# Patient Record
(demographics unavailable — no encounter records)

---

## 2024-11-14 NOTE — PHYSICAL EXAM
[Alert] : alert [No Acute Distress] : no acute distress [EOMI] : extra ocular movement intact [Thyroid Not Enlarged] : the thyroid was not enlarged [No Thyroid Nodules] : no palpable thyroid nodules [No Respiratory Distress] : no respiratory distress [Clear to Auscultation] : lungs were clear to auscultation bilaterally [Normal S1, S2] : normal S1 and S2 [Normal Rate] : heart rate was normal [Not Tender] : non-tender [Soft] : abdomen soft [No Spinal Tenderness] : no spinal tenderness [Oriented x3] : oriented to person, place, and time [Normal Affect] : the affect was normal [Normal Insight/Judgement] : insight and judgment were intact [Normal Mood] : the mood was normal [Kyphosis] : no kyphosis present [Scoliosis] : no scoliosis

## 2024-11-14 NOTE — DATA REVIEWED
[FreeTextEntry1] : Thyroid sono 6/2017 - Left thyroid nodule 6x3x3 mm  Thyroid sono 4/5/18 Right MP nodule - compex nodule, 3x3x2 mm Left lower pole nodule complex nodule 6x6x4 mm   Thyroid sono 4/3/19  stable Right nodule 3 mm, new small Right nodule 3 mm Left nodule 8mm (slight increased from 6 mm) and new small 2 mm Left nodule  Thyroid sono 9/30/19 normal thyroid size RMP nodule 3x3 mm - stable RLP nodule 3x2 mm - new on report but was documented on last sono LLP/inferior pole nodule 9x9 mm - slightly increased  Left thyroid nodule FNA 12/11/19 = AUS, Molecular testing not done b/c not enough sample  Thyroid sono 6/12/20 reviewed no nodule in Left thyroid seen RMP nodule 4x2x4 mm, stable RLP nodule 3x1x3, complex cystic, new  Thryoids son 10/2/20 RMP nodule 4x4x2 mm - cystic, stable RLP nodule 2x2x2 mm - cystic, stable no nodule Left lobe  Thyroid sonogram 9/28/21 RLP nodule 3x3x2 mm - cyst with debris, stable LUP nodule 2x2 mm - cyst, new LUP nodule 3x2x2 mm - colloid cyst, new LMP nodule 2x2x1 mm - colloid cyst, new  Thyroid sonogram 10/11/22  RMP nodule 6x3x6 mm - spongiform. isoechoic LUP nodule 2x2x2 mm - solid, hypoechoic LMP nodule 19x9x6 mm - solid, hypoechoic, irregular margins, new  Thyroid FNA biopsy 10/28/22 Left mid pole thyroid nodule 1.9 cm - benign follicular nodule c/w nodular goiter and post-hemorrhagic change, cat 2  Thyroid sonogram 10/3/23 RMP nodule 6x2x7 mm - TR4 stable LLP nodule 5x5x1 mm - TR3, new previously biopsied Left nodule not seen  Thyroids julia 8/15/24 subcm thyroid nodules, no parathyroid lesions - RMp nodule 7x3x5 mm, RUP nodule [id number], LMP 6x3x4 mm ======================================================= Labs 9/28/20 TSH 1.37, Ft4 1.3, negTPO ab Labs 10/21/22 TSH 2.150, Ft4 1.19, FT3 2.7 Labs 5/20/24 Gluc 91 Cr 0.61,  Ca 9.0, iPTH 56, alb 4, vit D 33 normal SPEP TSH 2.05 TTG neg  labs 5/20/24 Ca 9, PTH 56, alb 4, vit D 33 SPEP nml TSh 2.05 TTG ab neg  Labs 5/23/24: UPEP - elevated protein, 85, possible monoclonal immunoglobulin 24 hour urine calcium: 438

## 2024-11-14 NOTE — REVIEW OF SYSTEMS
[As Noted in HPI] : as noted in HPI [Dysphagia] : no dysphagia [Neck Pain] : no neck pain [Dysphonia] : no dysphonia

## 2024-11-14 NOTE — HISTORY OF PRESENT ILLNESS
[None] : The patient is not currently taking any medication for this problem. [Low Calcium Intake] : low calcium intake [Kidney Stones] : a history of kidney stones [Previous Fragility Fracture] : previous fragility fracture(s) [Regular Dental Follow-Up] : regular dental follow-up [Family History of Osteoporosis] : family history of osteoporosis [FreeTextEntry1] : Osteoporosis and recent right wrist fracture s/p fall, 2 years ago tried actonel and boniva but she stopped it on her own, went thru menopause at 41 yrs (intolerant to HRT) no h/o RT/VTE Mom also with early menopause and osteoporosis. BMD has been done by Gyn but she refused osteoporosis therapy - DXA 2024: Tscores, compared to 2023 L1-L4 -3.7, worsening -3.6% change LFN -2.5 Lhip -2.5 worsening  -5% change RFN -2.2  Rhip -2.2, worsening -5.4% change   Left Thyroid nodule since 2015 and in 2018 another Right thyroid nodule found and now with 2 Right thyroid nodules.   Left nodule increasing in sizen and attempted Left thyroid nodule FNA 12/2019 but results AUS.  Subsequent sonogram did not show Left thyroid nodule. Thyroid FNA biopsy 10/28/22 Left mid pole thyroid nodule 1.9 cm - benign follicular nodule c/w nodular goiter and post-hemorrhagic change, cat 2  Modifying factors: She does not take any thyroid medications.   There is no known radiation exposure to the neck.  Interval hx - (+) renal stones, had been seeing urology doctor in past, here for follow up lab results Thyroid sonogram 8/15/24 - subcm bilateral thyroid nodules, no parathyroid lesions s/p parathyroid surgery 10/28/24 w dr he - LLP, LUP and RLp parathyroid gland. LUP and LLP - hypercellular parathyroid did not start boniva yet [Low Vit D Intake/Exposure] : no low vitamin D intake [Premat. Menopause (surgery)] : no history of premature surgical menopause [Amenorrhea] : no amenorrhea [Disordered Eating] : no history of disordered eating [Taking Steroids] : no history of taking steroids [Diabetes] : no history of diabetes [Testosterone Deficiency] : no testosterone deficiency [Excessive Alcohol Intake] : no excessive alcohol intake [Excessive Soda] : no excessive soda [Sedentary] : no sedentary lifestyle [Current Smoking___(ppd)] : not currently smoking [Family History of Breast Cancer] : no family history of breast cancer [Family History of Hip Fracture] : no family history of hip fracture [History of Radiation Therapy] : no history of radiation therapy [History of Blood Clots] : no history of blood clots [High Fall Risk] : no fall risk [Frequent Falls] : no frequent falls [Uses a Walker/Cane] : no use of a walker/cane

## 2024-11-14 NOTE — ASSESSMENT
[FreeTextEntry1] : 59 yr old female with:  1. Bilateral thyroid nodules Left Thyroid nodule since 2015 and in 2018 another Right thyroid nodule found and now with 2 Right thyroid nodules. Left nodule increasing in size and attempted Left thyroid nodule FNA 12/2019 but results AUS. [place] 2022 with irregular Left nodule TR5, she is euthyroid - benign FNA in 2022 - repeat sono later this year - normal TSH  2. Osteoporosis - history of osteoporosis and recent right wrist fracture s/p fall, 2 years ago tried actonel and boniva for 6 months but she stopped it on her own, went thru menopause at 41 yrs (intolerant to HRT) no h/o RT/VTE Mom also with early menopause and osteoporosis. BMD has been done by Gyn but she refused osteoporosis therapy - tetsing revealed: normal TSH, SPEP, neg celiac, normal iPTH level, normal calcium and vit D - UPEP - elevated protein, 85, possible monoclonal immunoglobulin and elevated 24 hour urine calcium: 438; unclear if urine protein related to chronic kidney stones, advised urology follow up for hypercalcicuria and kidney stones, may need heme eval as well - again discussed OP therapy, she will fax results of most recent BMD, meds discussed and she is willing to start with monthly Boniva

## 2025-04-02 NOTE — ASSESSMENT
[FreeTextEntry1] : 60 yr old female with:  1. Bilateral thyroid nodules -  Left nodule increasing in size and attempted Left thyroid nodule FNA 12/2019 but results AUS. [place] 2022 with irregular Left nodule TR5 and benign FNA in 2022. Thyroid sonogram with bilateral subcm thyroid nodules - normal TSH - repeat sonogram later this year  2. Osteoporosis, primary HPT - history of osteoporosis and recent right wrist fracture s/p fall, 2 years ago tried actonel and boniva for 6 months but she stopped it on her own, went thru menopause at 41 yrs (intolerant to HRT) no h/o RT/VTE Mom also with early menopause and osteoporosis. She refused osteoporosis therapy in past - work up 5/2024 revealed: normal TSH, SPEP, neg celiac, normal iPTH level, normal calcium and vit D - UPEP - elevated protein, 85, possible monoclonal immunoglobulin and elevated 24 hour urine calcium: 438; unclear if urine protein related to chronic kidney stones, she followed up with urology and labs 7/2024 showed elevated PTH level.   -she saw Dr Gonzalez for primary hyperparathyroidism with subtle parathyroid adenoma on parathyroid CT.On Oct 28/2024 she underwent s/p parathyroid surgery (3 gland removal) 10/28/24 w Dr Gonzalez - LLP, LUP and RLP parathyroid gland. LUP and LLP - hypercellular parathyroid. Post op iPTH 38 with calcium 9.6 - repeat labs 2/2025  normal 24 hour urine calcium, normal UPEP, normal calcium, vitamin D, parathyroid hormone and thyroid hormone levels  - monitor calcium, PTH and vit D levels - suspect worsening osteoporosis, wrist fracture due to parathyroid disease given recent events and bone may improve post parathyroid surgery, hold off on Boniva at this time (she is hesitant to start this anyways), repeat DXA later this year - encouraged weight bearing exervise - cont calcium and vit D supplementation

## 2025-04-02 NOTE — HISTORY OF PRESENT ILLNESS
[Low Calcium Intake] : low calcium intake [Kidney Stones] : a history of kidney stones [Previous Fragility Fracture] : previous fragility fracture(s) [Regular Dental Follow-Up] : regular dental follow-up [Family History of Osteoporosis] : family history of osteoporosis [FreeTextEntry1] : Interval hx - feels well   Primary Hyperparathyroidism - (+) renal stones, saw Urology given h/o elevated 24 hour urine calcium and elevated PTH level in July 2024nand she advised to see Dr Gonzalez for primary hyperparathyroidism - Thyroid sonogram 8/15/24 - subcm bilateral thyroid nodules, no parathyroid lesions - Parathyroid Ct scan 10/1/24 - subtle focus os increased activity in Left mid-lower pole thyroid lobe, possible parathyroid adenoma - s/p parathyroid surgery (3 gland removal) 10/28/24 w Dr Gonzalez - LLP, LUP and RLP parathyroid gland. LUP and LLP - hypercellular parathyroid. Post op iPTH 38 with calcium 9.6 - labs 2/2025 normal 24 hour urine calcium, urine protein test, calcium, vitamin D, parathyroid hormone and thyroid hormone levels. - has been on Ca citrate 600 mg daily for renal stones and OTC Vit D daily  Osteoporosis and right wrist fracture s/p fall - 2 years ago tried actonel and boniva but she stopped it on her own, went thru menopause at 41 yrs (intolerant to HRT) no h/o RT/VTE Mom also with early menopause and osteoporosis.  - BMD has been done by Gyn but she refused osteoporosis therapy  - DXA 2023: Tscore psine -3.4 - DXA 2024: Tscores, compared to 2023 L1-L4 -3.7, worsening -3.6% change LFN -2.5 Lhip -2.5 worsening  -5% change RFN -2.2  Rhip -2.2, worsening -5.4% change ========================================================================================= Left Thyroid nodule since 2015 and in 2018 another Right thyroid nodule found and now with 2 Right thyroid nodules.   - 12/2019  Left nodule increasing in size and FNAb with AUS.  Subsequent sonogram did not show Left thyroid nodule.  - Thyroid FNA biopsy 10/28/22 Left mid pole thyroid nodule 1.9 cm - benign follicular nodule c/w nodular goiter and post-hemorrhagic change, cat 2 see below for thyroid imaging results  Modifying factors: She does not take any thyroid medications.   There is no known radiation exposure to the neck. Denies anterior neck pains dysphagia or voice changes   [Low Vit D Intake/Exposure] : no low vitamin D intake [Premat. Menopause (surgery)] : no history of premature surgical menopause [Amenorrhea] : no amenorrhea [Disordered Eating] : no history of disordered eating [Taking Steroids] : no history of taking steroids [Diabetes] : no history of diabetes [Testosterone Deficiency] : no testosterone deficiency [Excessive Alcohol Intake] : no excessive alcohol intake [Excessive Soda] : no excessive soda [Sedentary] : no sedentary lifestyle [Current Smoking___(ppd)] : not currently smoking [Family History of Breast Cancer] : no family history of breast cancer [Family History of Hip Fracture] : no family history of hip fracture [History of Radiation Therapy] : no history of radiation therapy [History of Blood Clots] : no history of blood clots [High Fall Risk] : no fall risk [Frequent Falls] : no frequent falls [Uses a Walker/Cane] : no use of a walker/cane [FreeTextEntry4] : per HPI

## 2025-04-02 NOTE — PHYSICAL EXAM
[Alert] : alert [No Acute Distress] : no acute distress [EOMI] : extra ocular movement intact [Normal Rate and Effort] : normal respiratory rate and effort [Clear to Auscultation] : lungs were clear to auscultation bilaterally [Normal S1, S2] : normal S1 and S2 [Normal Rate] : heart rate was normal [Not Tender] : non-tender [Soft] : abdomen soft [No Spinal Tenderness] : no spinal tenderness [Oriented x3] : oriented to person, place, and time [Normal Affect] : the affect was normal [Normal Insight/Judgement] : insight and judgment were intact [Normal Mood] : the mood was normal [Well Healed Scar] : well healed scar [Kyphosis] : no kyphosis present [Scoliosis] : no scoliosis

## 2025-04-02 NOTE — DATA REVIEWED
[FreeTextEntry1] : Thyroid sono 6/2017 - Left thyroid nodule 6x3x3 mm  Thyroid sono 4/5/18 Right MP nodule - compex nodule, 3x3x2 mm Left lower pole nodule complex nodule 6x6x4 mm   Thyroid sono 4/3/19  stable Right nodule 3 mm, new small Right nodule 3 mm Left nodule 8mm (slight increased from 6 mm) and new small 2 mm Left nodule  Thyroid sono 9/30/19 normal thyroid size RMP nodule 3x3 mm - stable RLP nodule 3x2 mm - new on report but was documented on last sono LLP/inferior pole nodule 9x9 mm - slightly increased  Left thyroid nodule FNA 12/11/19 = AUS, Molecular testing not done b/c not enough sample  Thyroid sono 6/12/20 reviewed no nodule in Left thyroid seen RMP nodule 4x2x4 mm, stable RLP nodule 3x1x3, complex cystic, new  Thryoids son 10/2/20 RMP nodule 4x4x2 mm - cystic, stable RLP nodule 2x2x2 mm - cystic, stable no nodule Left lobe  Thyroid sonogram 9/28/21 RLP nodule 3x3x2 mm - cyst with debris, stable LUP nodule 2x2 mm - cyst, new LUP nodule 3x2x2 mm - colloid cyst, new LMP nodule 2x2x1 mm - colloid cyst, new  Thyroid sonogram 10/11/22  RMP nodule 6x3x6 mm - spongiform. isoechoic LUP nodule 2x2x2 mm - solid, hypoechoic LMP nodule 19x9x6 mm - solid, hypoechoic, irregular margins, new  Thyroid FNA biopsy 10/28/22 Left mid pole thyroid nodule 1.9 cm - benign follicular nodule c/w nodular goiter and post-hemorrhagic change, cat 2  Thyroid sonogram 10/3/23 RMP nodule 6x2x7 mm - TR4 stable LLP nodule 5x5x1 mm - TR3, new previously biopsied Left nodule not seen  Thyroids julia 8/15/24 subcm thyroid nodules, no parathyroid lesions - RMp nodule 7x3x5 mm, RUP nodule [id number], LMP 6x3x4 mm ======================================================= Labs 9/28/20 TSH 1.37, Ft4 1.3, negTPO ab Labs 10/21/22 TSH 2.150, Ft4 1.19, FT3 2.7 Labs 5/20/24 Gluc 91 Cr 0.61,  Ca 9.0, iPTH 56, alb 4, vit D 33 normal SPEP TSH 2.05 TTG neg  labs 5/20/24 Ca 9, PTH 56, alb 4, vit D 33 SPEP nml TSh 2.05 TTG ab neg  Labs 5/23/24: UPEP - elevated protein, 85, possible monoclonal immunoglobulin 24 hour urine calcium: 438  labs 2/2025  normal 24 hour urine calcium normal UPEP normal calcium, vitamin D, parathyroid hormone and thyroid hormone levels

## 2025-06-06 NOTE — END OF VISIT
[FreeTextEntry3] : I, Rosetta Gonzalez solely acted as scribe for Dr. Tova Haider on 06/04/2025  All medical entries made by the Scribe were at my, Dr. Quintanilla, direction and personally dictated by me on 06/04/2025 . I have reviewed the chart and agree that the record accurately reflects my personal performance of the history, physical exam, assessment and plan. I have also personally directed, reviewed, and agreed with the chart.

## 2025-06-06 NOTE — PLAN
[FreeTextEntry1] : Pt is due for a repeat colonoscopy. Referred to colorectal specialist for baseline colonoscopy.  Pap done today.  Prescription for mammogram screening and breast US given. Self-breast exam reviewed.  Continue bone care with endocrine.  She will follow up annually and as needed.

## 2025-06-06 NOTE — PHYSICAL EXAM
[MA] : MA [Chaperoned Physical Exam] : A chaperone was present in the examining room during all aspects of the physical examination. [Appropriately responsive] : appropriately responsive [Alert] : alert [No Acute Distress] : no acute distress [Soft] : soft [Non-tender] : non-tender [Non-distended] : non-distended [Oriented x3] : oriented x3 [Examination Of The Breasts] : a normal appearance [No Discharge] : no discharge [No Masses] : no breast masses were palpable [Labia Majora] : normal [Labia Minora] : normal [Atrophy] : atrophy [Dry Mucosa] : dry mucosa [No Bleeding] : There was no active vaginal bleeding [Normal] : normal [Uterine Adnexae] : non-palpable [FreeTextEntry2] : ARTURO JESSICA

## 2025-06-06 NOTE — HISTORY OF PRESENT ILLNESS
[LMP unknown] : LMP unknown [postmenopausal] : postmenopausal [Y] : Positive pregnancy history [unknown] : Patient is unsure of the date of her LMP [Menarche Age: ____] : age at menarche was [unfilled] [Menopause Age: ____] : age at menopause was [unfilled] [No] : Patient does not have concerns regarding sex [Mammogramdate] : 02/26/24 [TextBox_19] : BR-2 [BreastSonogramDate] : 02/26/24 [TextBox_25] : BR-2 [PapSmeardate] : 3/1/22 [TextBox_31] : WNL [BoneDensityDate] : 7/2/24 [TextBox_37] : OSTEOPOROSIS [HPVDate] : 3/1/22 [TextBox_78] : WNL [PGHxTotal] : 3 [[facility]] : 2 [[facility] : 2 [PGHxABSpont] : 1 [FreeTextEntry1] :  X2

## 2025-06-06 NOTE — HISTORY OF PRESENT ILLNESS
[LMP unknown] : LMP unknown [postmenopausal] : postmenopausal [Y] : Positive pregnancy history [unknown] : Patient is unsure of the date of her LMP [Menarche Age: ____] : age at menarche was [unfilled] [Menopause Age: ____] : age at menopause was [unfilled] [No] : Patient does not have concerns regarding sex [Mammogramdate] : 02/26/24 [BreastSonogramDate] : 02/26/24 [TextBox_19] : BR-2 [TextBox_25] : BR-2 [PapSmeardate] : 3/1/22 [TextBox_31] : WNL [BoneDensityDate] : 7/2/24 [TextBox_37] : OSTEOPOROSIS [HPVDate] : 3/1/22 [TextBox_78] : WNL [PGHxTotal] : 3 [[facility] 2 [[facility] : 2 [PGHxABSpont] : 1 [FreeTextEntry1] :  X2